# Patient Record
Sex: FEMALE | HISPANIC OR LATINO | Employment: UNEMPLOYED | URBAN - METROPOLITAN AREA
[De-identification: names, ages, dates, MRNs, and addresses within clinical notes are randomized per-mention and may not be internally consistent; named-entity substitution may affect disease eponyms.]

---

## 2023-03-22 ENCOUNTER — TELEMEDICINE (OUTPATIENT)
Dept: ENDOCRINOLOGY | Facility: MEDICAL CENTER | Age: 43
End: 2023-03-22
Payer: COMMERCIAL

## 2023-03-22 DIAGNOSIS — E03.9 HYPOTHYROIDISM, ACQUIRED: ICD-10-CM

## 2023-03-22 DIAGNOSIS — E06.3 HASHIMOTO'S DISEASE: ICD-10-CM

## 2023-03-22 DIAGNOSIS — E55.9 VITAMIN D DEFICIENCY: ICD-10-CM

## 2023-03-22 PROCEDURE — 99204 OFFICE O/P NEW MOD 45 MIN: CPT | Mod: 95

## 2023-03-22 NOTE — PROGRESS NOTES
Chief Complaint: Consult requested by Kylie Trujillo P.A.-C. for evaluation of the following conditions  Patient was presented for a telehealth consultation via secure and encrypted videoconferencing technology. This encounter was conducted via Zoom . Verbal consent was obtained. Patient's identity was verified.   HPI:   Yolanda Wall is a 42 y.o. female     Subclinical Hypothyroidism:   She reports: feeling slow  She denies fatigue, weight gain, feeling cold and cold intolerance, constipation, swelling, losing hair, anxiousness, feeling excessive energy, tremulousness, palpitations, sweating, and weight loss.    She denies lumps or enlargement in the neck.    She denies a family history of thyroid disease     Currently taking multivitamins on and off.     Blood work done from [facility] on 09/2/2022 showed:  Free T3 at 3.2 (2-4 0.4)    2. Hashimoto's Disease:  09/2/2022 in [facility]   TPO  (0-34)  Thyroglobulin AB 10.6 (0.0-0.9)    3. Vitamin D deficiency:   Currently not taking any vitamin D     Patient's medications, allergies, and social histories were reviewed and updated as appropriate.      ROS:     CONS:     No fever, no chills, no weight loss, no fatigue   EYES:      No diplopia, no blurry vision, no redness of eyes, no swelling of eyelids   ENT:    No hearing loss, No ear pain, No sore throat, no dysphagia, no neck swelling   CV:     No chest pain, no palpitations, no claudication, no orthopnea, no PND   PULM:    No SOB, no cough, no hemoptysis, no wheezing    GI:   No nausea, no vomiting, no diarrhea, no constipation, no bloody stools   :  Passing urine well, no dysuria, no hematuria   ENDO:   No polyuria, no polydipsia, no heat intolerance, no cold intolerance   NEURO: No headaches, no dizziness, no convulsions, no tremors   MUSC:  No joint swellings, no arthralgias, no myalgias, no weakness   SKIN:   No rash, no ulcers, no dry skin   PSYCH:   No depression, no anxiety, no  difficulty sleeping       Past Medical History:  Patient Active Problem List    Diagnosis Date Noted    Postpartum care and examination of lactating mother 04/21/2014       Past Surgical History:  No past surgical history on file.     Allergies:  Nkda [no known drug allergy]     Current Medications:    Current Outpatient Medications:     ibuprofen (MOTRIN) 800 MG TABS, Take 1 Tab by mouth every 8 hours as needed (Cramping). (Patient not taking: Reported on 10/6/2020), Disp: 30 Tab, Rfl: 0    prenatal vit/fe fumarate/fa (PRENATAL S) 27-0.8 MG TABS, Take 1 Tab by mouth every morning., Disp: , Rfl:     Social History:  Social History     Socioeconomic History    Marital status:      Spouse name: Not on file    Number of children: Not on file    Years of education: Not on file    Highest education level: Not on file   Occupational History    Not on file   Tobacco Use    Smoking status: Never    Smokeless tobacco: Never   Substance and Sexual Activity    Alcohol use: No    Drug use: No    Sexual activity: Yes     Partners: Male     Comment: none, planned pregnancy FOB involved   Other Topics Concern    Not on file   Social History Narrative    Not on file     Social Determinants of Health     Financial Resource Strain: Not on file   Food Insecurity: Not on file   Transportation Needs: Not on file   Physical Activity: Not on file   Stress: Not on file   Social Connections: Not on file   Intimate Partner Violence: Not on file   Housing Stability: Not on file        Family History:   No family history on file.      PHYSICAL EXAM:   Vital signs: Virtual visit  GENERAL: Well-developed, well-nourished  in no apparent distress.     Labs:  Lab Results   Component Value Date/Time    WBC 10.7 03/08/2014 05:09 AM    RBC 4.65 03/08/2014 05:09 AM    HEMOGLOBIN 14.0 03/08/2014 05:09 AM    MCV 90.0 03/08/2014 05:09 AM    MCH 30.1 03/08/2014 05:09 AM    MCHC 33.5 03/08/2014 05:09 AM    RDW 17.9 (H) 03/08/2014 05:09 AM    MPV 10.5  (H) 03/08/2014 05:09 AM       Imaging:      ASSESSMENT/PLAN:   1. Hypothyroidism, acquired  Clinically mildly unstable  Biochemically I do not have fully blood work  - TSH; Future  - FREE THYROXINE; Future  - T3 FREE; Future  - Comp Metabolic Panel; Future  - TSI; Future  - THYROTROPIN RECEP AB; Future  - THYROID PEROXIDASE  (TPO) AB; Future  - ANTITHYROGLOBULIN AB; Future    Ultrasound ordered for evaluation  - US-THYROID; Future    2. Hashimoto's disease  Positive for Hashimoto's antibodies    3. Vitamin D deficiency  I will evaluate levels  - VITAMIN D,25 HYDROXY (DEFICIENCY); Future     Disposition: Make an appointment to follow-up in 3 months  Do your blood work 2 weeks prior to next appointment-bring your blood work results to the office  Schedule thyroid ultrasound as soon as possible    Thank you kindly for allowing me to participate in the thyroid care plan for this patient.    JOSSIE HackettRCinthyaN.  03/22/23    CC:   Kylie Trujillo P.A.-C.

## 2023-06-14 ENCOUNTER — TELEPHONE (OUTPATIENT)
Dept: ENDOCRINOLOGY | Facility: MEDICAL CENTER | Age: 43
End: 2023-06-14
Payer: COMMERCIAL

## 2023-06-23 ENCOUNTER — OFFICE VISIT (OUTPATIENT)
Dept: ENDOCRINOLOGY | Facility: MEDICAL CENTER | Age: 43
End: 2023-06-23
Payer: COMMERCIAL

## 2023-06-23 VITALS
HEART RATE: 94 BPM | BODY MASS INDEX: 26.83 KG/M2 | SYSTOLIC BLOOD PRESSURE: 108 MMHG | OXYGEN SATURATION: 99 % | DIASTOLIC BLOOD PRESSURE: 66 MMHG | HEIGHT: 62 IN | WEIGHT: 145.8 LBS

## 2023-06-23 DIAGNOSIS — E55.9 VITAMIN D DEFICIENCY: ICD-10-CM

## 2023-06-23 DIAGNOSIS — E03.9 HYPOTHYROIDISM, ACQUIRED: ICD-10-CM

## 2023-06-23 DIAGNOSIS — E06.3 HASHIMOTO'S DISEASE: ICD-10-CM

## 2023-06-23 PROCEDURE — 3074F SYST BP LT 130 MM HG: CPT

## 2023-06-23 PROCEDURE — 99214 OFFICE O/P EST MOD 30 MIN: CPT

## 2023-06-23 PROCEDURE — 99211 OFF/OP EST MAY X REQ PHY/QHP: CPT

## 2023-06-23 PROCEDURE — 3078F DIAST BP <80 MM HG: CPT

## 2023-06-23 RX ORDER — FERROUS SULFATE 325(65) MG
325 TABLET ORAL
COMMUNITY
Start: 2023-04-11

## 2023-06-23 NOTE — PROGRESS NOTES
Chief Complaint: Follow-up: Following conditions  HPI:   Yolanda Wall is a 42 y.o. female           Hashimoto's Disease:     She reports: feeling slow  She denies fatigue, weight gain, feeling cold and cold intolerance, constipation, swelling, losing hair, anxiousness, feeling excessive energy, tremulousness, palpitations, sweating, and weight loss.    She denies lumps or enlargement in the neck.      Currently taking multivitamins on and off.           09/2/2022 in [facility]   TPO  (0-34)  Thyroglobulin AB 10.6 (0.0-0.9)                3. Vitamin D deficiency:   Currently not taking any vitamin D     Patient's medications, allergies, and social histories were reviewed and updated as appropriate.      ROS:     CONS:     No fever, no chills, no weight loss, no fatigue   EYES:      No diplopia, no blurry vision, no redness of eyes, no swelling of eyelids   ENT:    No hearing loss, No ear pain, No sore throat, no dysphagia, no neck swelling   CV:     No chest pain, no palpitations, no claudication, no orthopnea, no PND   PULM:    No SOB, no cough, no hemoptysis, no wheezing    GI:   No nausea, no vomiting, no diarrhea, no constipation, no bloody stools   :  Passing urine well, no dysuria, no hematuria   ENDO:   No polyuria, no polydipsia, no heat intolerance, no cold intolerance   NEURO: No headaches, no dizziness, no convulsions, no tremors   MUSC:  No joint swellings, no arthralgias, no myalgias, no weakness   SKIN:   No rash, no ulcers, no dry skin   PSYCH:   No depression, no anxiety, no difficulty sleeping       Past Medical History:  Patient Active Problem List    Diagnosis Date Noted    Postpartum care and examination of lactating mother 04/21/2014       Past Surgical History:  No past surgical history on file.     Allergies:  Nkda [no known drug allergy]     Current Medications:    Current Outpatient Medications:     ibuprofen (MOTRIN) 800 MG TABS, Take 1 Tab by mouth every 8 hours as  needed (Cramping). (Patient not taking: Reported on 10/6/2020), Disp: 30 Tab, Rfl: 0    prenatal vit/fe fumarate/fa (PRENATAL S) 27-0.8 MG TABS, Take 1 Tab by mouth every morning., Disp: , Rfl:     Social History:  Social History     Socioeconomic History    Marital status:      Spouse name: Not on file    Number of children: Not on file    Years of education: Not on file    Highest education level: Not on file   Occupational History    Not on file   Tobacco Use    Smoking status: Never    Smokeless tobacco: Never   Substance and Sexual Activity    Alcohol use: No    Drug use: No    Sexual activity: Yes     Partners: Male     Comment: none, planned pregnancy FOB involved   Other Topics Concern    Not on file   Social History Narrative    Not on file     Social Determinants of Health     Financial Resource Strain: Not on file   Food Insecurity: Not on file   Transportation Needs: Not on file   Physical Activity: Not on file   Stress: Not on file   Social Connections: Not on file   Intimate Partner Violence: Not on file   Housing Stability: Not on file        Family History:   No family history on file.      PHYSICAL EXAM:   Vital signs:   Vitals:    06/23/23 1020   BP: 108/66   Pulse: 94   SpO2: 99%      GENERAL: Well-developed, well-nourished  in no apparent distress.     Labs:  Lab Results   Component Value Date/Time    WBC 10.7 03/08/2014 05:09 AM    RBC 4.65 03/08/2014 05:09 AM    HEMOGLOBIN 14.0 03/08/2014 05:09 AM    MCV 90.0 03/08/2014 05:09 AM    MCH 30.1 03/08/2014 05:09 AM    MCHC 33.5 03/08/2014 05:09 AM    RDW 17.9 (H) 03/08/2014 05:09 AM    MPV 10.5 (H) 03/08/2014 05:09 AM       Imaging:      ASSESSMENT/PLAN:   1. Hashimoto's disease  Clinically patient reports feeling slow occasionally but this is not bothersome to patient  Biochemically her TSH is high normal but in general her thyroid hormone are within range  We discussed pathophysiology of Hashimoto's disease, risk, when to treat for  hypothyroidism  Patient and I discussed whether or not to try the low-dose Synthroid-at this time patient would like to monitor and repeat this test in 6 months  Ultrasound ordered for baseline evaluation  - TSH; Future  - FREE THYROXINE; Future  - Comp Metabolic Panel; Future  - US-THYROID; Future    2. Vitamin D deficiency  Unstable  Encouraged to take 3000 IUs OTC daily vitamin D3  - VITAMIN D,25 HYDROXY (DEFICIENCY); Future      Disposition: Make an appointment to follow-up in 6 months  Do your blood work 2 weeks prior to next appointment-bring your blood work results to the office  Schedule thyroid ultrasound as soon as possible    Thank you kindly for allowing me to participate in the thyroid care plan for this patient.    JOSSIE HackettRCinthyaN.  06/23/23      CC:   Kylie Trujillo P.A.-C.

## 2023-12-08 ENCOUNTER — APPOINTMENT (OUTPATIENT)
Dept: ENDOCRINOLOGY | Facility: MEDICAL CENTER | Age: 43
End: 2023-12-08
Payer: COMMERCIAL

## 2024-01-18 ENCOUNTER — HOSPITAL ENCOUNTER (OUTPATIENT)
Dept: RADIOLOGY | Facility: MEDICAL CENTER | Age: 44
End: 2024-01-18
Payer: COMMERCIAL

## 2024-01-18 DIAGNOSIS — E06.3 HASHIMOTO'S DISEASE: ICD-10-CM

## 2024-01-18 PROCEDURE — 76536 US EXAM OF HEAD AND NECK: CPT

## 2024-01-23 ENCOUNTER — OFFICE VISIT (OUTPATIENT)
Dept: ENDOCRINOLOGY | Facility: MEDICAL CENTER | Age: 44
End: 2024-01-23
Payer: COMMERCIAL

## 2024-01-23 VITALS
DIASTOLIC BLOOD PRESSURE: 78 MMHG | WEIGHT: 150.4 LBS | BODY MASS INDEX: 26.65 KG/M2 | SYSTOLIC BLOOD PRESSURE: 120 MMHG | HEART RATE: 98 BPM | HEIGHT: 63 IN | OXYGEN SATURATION: 98 %

## 2024-01-23 DIAGNOSIS — E04.1 THYROID NODULE: ICD-10-CM

## 2024-01-23 DIAGNOSIS — E55.9 VITAMIN D DEFICIENCY: ICD-10-CM

## 2024-01-23 DIAGNOSIS — E06.3 HASHIMOTO'S DISEASE: ICD-10-CM

## 2024-01-23 PROCEDURE — 3074F SYST BP LT 130 MM HG: CPT

## 2024-01-23 PROCEDURE — 99211 OFF/OP EST MAY X REQ PHY/QHP: CPT

## 2024-01-23 PROCEDURE — 99214 OFFICE O/P EST MOD 30 MIN: CPT

## 2024-01-23 PROCEDURE — 3078F DIAST BP <80 MM HG: CPT

## 2024-01-23 NOTE — PROGRESS NOTES
Chief Complaint: Follow-up: Following conditions  HPI:   Yolanda Wall is a 43 y.o. female           Hashimoto's Disease:   She denies fatigue, weight gain, feeling cold and cold intolerance, constipation, swelling, losing hair, anxiousness, feeling excessive energy, tremulousness, palpitations, sweating, and weight loss.    She denies lumps or enlargement in the neck.      Currently taking multivitamins on and off.                 09/2/2022 in [facility]   TPO  (0-34)  Thyroglobulin AB 10.6 (0.0-0.9)            2. Thyroid Nodule:    Thyroid US done on 1/18/2023 showed:  Nodule #1 located on the L middle lobe, measuring 2.59 cm, solid Isoechoic, with microscopic foci TR4      3.Vitamin D deficiency:   Currently taking 3000IUs daily             Patient's medications, allergies, and social histories were reviewed and updated as appropriate.      ROS:     CONS:     No fever, no chills, no weight loss, no fatigue   EYES:      No diplopia, no blurry vision, no redness of eyes, no swelling of eyelids   ENT:    No hearing loss, No ear pain, No sore throat, no dysphagia, no neck swelling   CV:     No chest pain, no palpitations, no claudication, no orthopnea, no PND   PULM:    No SOB, no cough, no hemoptysis, no wheezing    GI:   No nausea, no vomiting, no diarrhea, no constipation, no bloody stools   :  Passing urine well, no dysuria, no hematuria   ENDO:   No polyuria, no polydipsia, no heat intolerance, no cold intolerance   NEURO: No headaches, no dizziness, no convulsions, no tremors   MUSC:  No joint swellings, no arthralgias, no myalgias, no weakness   SKIN:   No rash, no ulcers, no dry skin   PSYCH:   No depression, no anxiety, no difficulty sleeping       Past Medical History:  Patient Active Problem List    Diagnosis Date Noted    Postpartum care and examination of lactating mother 04/21/2014       Past Surgical History:  No past surgical history on file.     Allergies:  Nkda [no known drug  allergy]     Current Medications:    Current Outpatient Medications:     FEROSUL 325 (65 Fe) MG tablet, Take 325 mg by mouth every day., Disp: , Rfl:     ibuprofen (MOTRIN) 800 MG TABS, Take 1 Tab by mouth every 8 hours as needed (Cramping). (Patient not taking: Reported on 6/23/2023), Disp: 30 Tab, Rfl: 0    prenatal vit/fe fumarate/fa (PRENATAL S) 27-0.8 MG TABS, Take 1 Tab by mouth every morning. (Patient not taking: Reported on 6/23/2023), Disp: , Rfl:     Social History:  Social History     Socioeconomic History    Marital status:      Spouse name: Not on file    Number of children: Not on file    Years of education: Not on file    Highest education level: Not on file   Occupational History    Not on file   Tobacco Use    Smoking status: Never    Smokeless tobacco: Never   Substance and Sexual Activity    Alcohol use: No    Drug use: No    Sexual activity: Yes     Partners: Male     Comment: none, planned pregnancy FOB involved   Other Topics Concern    Not on file   Social History Narrative    Not on file     Social Determinants of Health     Financial Resource Strain: Not on file   Food Insecurity: Not on file   Transportation Needs: Not on file   Physical Activity: Not on file   Stress: Not on file   Social Connections: Not on file   Intimate Partner Violence: Not on file   Housing Stability: Not on file        Family History:   No family history on file.      PHYSICAL EXAM:   Vital signs:   Vitals:    01/23/24 0832   BP: 120/78   Pulse: 98   SpO2: 98%        GENERAL: Well-developed, well-nourished  in no apparent distress.     Labs:  Lab Results   Component Value Date/Time    WBC 10.7 03/08/2014 05:09 AM    RBC 4.65 03/08/2014 05:09 AM    HEMOGLOBIN 14.0 03/08/2014 05:09 AM    MCV 90.0 03/08/2014 05:09 AM    MCH 30.1 03/08/2014 05:09 AM    MCHC 33.5 03/08/2014 05:09 AM    RDW 17.9 (H) 03/08/2014 05:09 AM    MPV 10.5 (H) 03/08/2014 05:09 AM       Imaging:      ASSESSMENT/PLAN:   1. Hashimoto's  disease  - Stable  --Biochemaclly stable  --No thyroid medication at this time    - TSH; Future  - FREE THYROXINE; Future  - Comp Metabolic Panel; Future    2. Thyroid nodule  Unstable  Based on results it is my recommendation to perform a thyroid Biopsy  All questions answered  Discussed the possible results - benign, cancer, poor sampling, undetermined   - US-FNA BIOPSY W/ US GUIDANCE; Future    3. Vitamin D deficiency  - Stable  -Continue regimen-see HPI         Disposition: Make an appointment to follow-up in 6 months  Do your blood work 2 weeks prior to next appointment-bring your blood work results to the office  Schedule thyroid ultrasound as soon as possible    Thank you kindly for allowing me to participate in the thyroid care plan for this patient.    Enmanuel Sawyer, MILANA.P.R.N.  01/23/24          CC:   Kylie Trujillo P.A.-C.

## 2024-01-30 ENCOUNTER — HOSPITAL ENCOUNTER (OUTPATIENT)
Dept: RADIOLOGY | Facility: MEDICAL CENTER | Age: 44
End: 2024-01-30
Payer: COMMERCIAL

## 2024-01-30 DIAGNOSIS — E04.1 THYROID NODULE: ICD-10-CM

## 2024-01-30 LAB — CYTOLOGY REG CYTOL: NORMAL

## 2024-01-30 PROCEDURE — 10005 FNA BX W/US GDN 1ST LES: CPT

## 2024-01-30 PROCEDURE — 88173 CYTOPATH EVAL FNA REPORT: CPT

## 2024-01-30 NOTE — PROGRESS NOTES
US guided left middle lobe thyroid nodule fine needle aspiration done by Dr. Wilson; NON-SEDATION (no H&P required as this is a NON SEDATION procedure) left aspect of neck access site, dressing CDI; 3 passes in 1 jar of cytolyt obtained, 2 passes in 1 vial afirma obtained and sent to lab. Pt tolerated the procedure well. Pt hemodynamically stable pre/intra/post procedure; all questions and concerns answered prior to being d/c; patient provided with appropriate education for procedure; pt d/c home.

## 2024-03-19 ENCOUNTER — OFFICE VISIT (OUTPATIENT)
Dept: ENDOCRINOLOGY | Facility: MEDICAL CENTER | Age: 44
End: 2024-03-19
Payer: COMMERCIAL

## 2024-03-19 VITALS
BODY MASS INDEX: 27.7 KG/M2 | SYSTOLIC BLOOD PRESSURE: 112 MMHG | DIASTOLIC BLOOD PRESSURE: 64 MMHG | OXYGEN SATURATION: 98 % | HEART RATE: 84 BPM | HEIGHT: 62 IN | WEIGHT: 150.5 LBS

## 2024-03-19 DIAGNOSIS — E55.9 VITAMIN D DEFICIENCY: ICD-10-CM

## 2024-03-19 DIAGNOSIS — E07.9 THYROID DISORDER: ICD-10-CM

## 2024-03-19 DIAGNOSIS — E04.1 THYROID NODULE: ICD-10-CM

## 2024-03-19 DIAGNOSIS — E06.3 HASHIMOTO'S DISEASE: ICD-10-CM

## 2024-03-19 PROCEDURE — 3074F SYST BP LT 130 MM HG: CPT

## 2024-03-19 PROCEDURE — 99214 OFFICE O/P EST MOD 30 MIN: CPT

## 2024-03-19 PROCEDURE — 99211 OFF/OP EST MAY X REQ PHY/QHP: CPT

## 2024-03-19 PROCEDURE — 3078F DIAST BP <80 MM HG: CPT

## 2024-03-19 RX ORDER — MULTIVIT-MIN/IRON/FOLIC ACID/K 18-600-40
3000 CAPSULE ORAL DAILY
COMMUNITY

## 2024-03-19 NOTE — PROGRESS NOTES
Chief Complaint: Follow-up: Following conditions  HPI:   Yolanda Wall is a 43 y.o. female           Hashimoto's Disease:   She denies fatigue, weight gain, feeling cold and cold intolerance, constipation, swelling, anxiousness, feeling excessive energy, tremulousness, palpitations, sweating, and weight loss.   Reports  forgetful, hair loss   She denies lumps or enlargement in the neck.      Currently taking multivitamins on and off.                   09/2/2022 in [facility]   TPO  (0-34)  Thyroglobulin AB 10.6 (0.0-0.9)    2. Thyroid Nodule:    Thyroid US done on 1/18/2023 showed:  Nodule #1 located on the L middle lobe, measuring 2.59 cm, solid Isoechoic, with microscopic foci TR4        3.Vitamin D deficiency:   Currently taking 3000IUs daily         4. Thyroid Disorder:  Pt is also taking iron for iron deficiency anemia-treated by pcp    Reports  forgetful, hair loss             Patient's medications, allergies, and social histories were reviewed and updated as appropriate.  ROS:     CONS:     No fever, no chills, no weight loss, no fatigue   EYES:      No diplopia, no blurry vision, no redness of eyes, no swelling of eyelids   ENT:    No hearing loss, No ear pain, No sore throat, no dysphagia, no neck swelling   CV:     No chest pain, no palpitations, no claudication, no orthopnea, no PND   PULM:    No SOB, no cough, no hemoptysis, no wheezing    GI:   No nausea, no vomiting, no diarrhea, no constipation, no bloody stools   :  Passing urine well, no dysuria, no hematuria   ENDO:   No polyuria, no polydipsia, no heat intolerance, no cold intolerance   NEURO: No headaches, no dizziness, no convulsions, no tremors   MUSC:  No joint swellings, no arthralgias, no myalgias, no weakness   SKIN:   No rash, no ulcers, no dry skin   PSYCH:   No depression, no anxiety, no difficulty sleeping       Past Medical History:  Patient Active Problem List    Diagnosis Date Noted    Postpartum care and  examination of lactating mother 04/21/2014       Past Surgical History:  No past surgical history on file.     Allergies:  Nkda [no known drug allergy]     Current Medications:    Current Outpatient Medications:     FEROSUL 325 (65 Fe) MG tablet, Take 325 mg by mouth every day., Disp: , Rfl:     Social History:  Social History     Socioeconomic History    Marital status:      Spouse name: Not on file    Number of children: Not on file    Years of education: Not on file    Highest education level: Not on file   Occupational History    Not on file   Tobacco Use    Smoking status: Never    Smokeless tobacco: Never   Substance and Sexual Activity    Alcohol use: No    Drug use: No    Sexual activity: Yes     Partners: Male     Comment: none, planned pregnancy FOB involved   Other Topics Concern    Not on file   Social History Narrative    Not on file     Social Determinants of Health     Financial Resource Strain: Not on file   Food Insecurity: Not on file   Transportation Needs: Not on file   Physical Activity: Not on file   Stress: Not on file   Social Connections: Not on file   Intimate Partner Violence: Not on file   Housing Stability: Not on file        Family History:   No family history on file.      PHYSICAL EXAM:   Vital signs:   Vitals:    03/19/24 0841   BP: 112/64   Pulse: 84   SpO2: 98%          GENERAL: Well-developed, well-nourished  in no apparent distress.     Labs:  Lab Results   Component Value Date/Time    WBC 10.7 03/08/2014 05:09 AM    RBC 4.65 03/08/2014 05:09 AM    HEMOGLOBIN 14.0 03/08/2014 05:09 AM    MCV 90.0 03/08/2014 05:09 AM    MCH 30.1 03/08/2014 05:09 AM    MCHC 33.5 03/08/2014 05:09 AM    RDW 17.9 (H) 03/08/2014 05:09 AM    MPV 10.5 (H) 03/08/2014 05:09 AM       Imaging:      ASSESSMENT/PLAN:   1. Hashimoto's disease  - Stable  --Biochemaclly stable  --No thyroid medication at this time      2. Thyroid nodule  - Stable  Benign thyroid biopsy   Next thyroid US to be done in 6  months to 12 months       3. Vitamin D deficiency  - Stable  -Continue regimen-see HPI     4. Thyroid disorder  Clinically mildly unstable but pt feels she is feeling better now that she is being treated for iron deficiency anemia by her pcp  We will monitor her levels at time for high normal TSH   - TSH; Future  - FREE THYROXINE; Future  - Comp Metabolic Panel; Future     Disposition: Make an appointment to follow-up in 6 months  Do blood work 2 weeks before next appt  US thyroid to be ordered at next appt     Thank you kindly for allowing me to participate in the thyroid care plan for this patient.    Enmanuel Sawyer A.P.R.N.  01/23/24          CC:   Kylie Trujillo P.A.-C.

## 2024-09-15 NOTE — PROGRESS NOTES
"Follow up Endocrinology Visit  Last visit with Enmanuel Sawyer on: 3/19/24  Referred by: Kylie Trujillo P.A.-C. (Inactive)    Chief complaint:  Yolanda Wall, 44 y.o., Gren[place] speaking female, who is here for follow up for thyroid nodule. Communication with help of .     Interval history:   - since last visit no significant events  - denies neck compressive symptoms  - reports stable weight, has some day time sleepiness - intermittently, denies dry skin, constipation, cold intolerance    Thyroid nodule:  - first found in 1/2024 on palpation -> thyroid US -> 2.59 cm L thyroid nodule, TR 4 -> FNA -> benign  - compressive symptoms denies  - subclinical hypothyroidism  - Hx of radiation exposure - denies  - Fhx of thyroid cancer - denies    Medications:  Current Outpatient Medications:     Vitamin D, Cholecalciferol, (CHOLECALCIFEROL) 25 MCG (1000 UT) Tab, Take 3,000 Units by mouth every day., Disp: , Rfl:     FEROSUL 325 (65 Fe) MG tablet, Take 325 mg by mouth every day., Disp: , Rfl:     Physical Examination:   Vital signs: /82   Pulse 90   Ht 1.549 m (5' 1\") Comment: pt stated  Wt 67.7 kg (149 lb 4.8 oz)   SpO2 99%   BMI 28.21 kg/m²    General: No distress, cooperative, well dressed and well nourished.   Eyes: No scleral icterus or discharge  ENMT: Normal on external inspection of nose, lips, No nasal drainage   Neck: No abnormal masses on inspection. Palpable L thyroid nodule.  Resp: Normal effort  CVS: Regular rate and rhythm  Extremities: No edema bilateral extremities  Neuro: Alert and oriented  Skin: No rash, No Ulcers  Psych: Normal mood and affect    Labs:  - reviewed   Reference Range  9/2/22 1/22/24 9/12/24   TSH 0.36 - 3.74 4.16 3.56 4.15 (H)   fT4 0.8 - 1.8 1.1  1.11   TPO-Abs 0 - 34 115 (H)     Tg-Abs 0.0 - 0.9 10.6 (H)     Vit D 30 - 100        Imaging:  - reviewed  Thyroid US on 1/18/2024:  HISTORY/REASON FOR EXAM:  Hashimoto's disease  TECHNIQUE/EXAM DESCRIPTION: " Ultrasound of the soft tissues of the head and neck.  COMPARISON:  None  FINDINGS:  The thyroid gland is heterogeneous.  Vascularity is normal.  The right lobe of the thyroid gland measures 1.72 cm x 5.33 cm x 1.94 cm.  The left lobe of the thyroid gland measures 1.81 cm x 5.00 cm x 2.35 cm.  The isthmus measures 0.22 cm.  Nodules >= 1cm:  Nodule #1  Location:  Left  mid  Size:  2.59 x 2.01 x 1.46 cm  Composition:  Solid-2  Echogenicity:  Isoechoic-1  Shape:  Wider than tall-0  Margins:  Smooth-0  Echogenic Foci:  Microscopic-3  ACR TIRADS points/category:  6 - TR4 - Moderately Suspicious  IMPRESSION:  1.  Moderately suspicious 2.6 cm left mid thyroid gland nodule.    Cytology:  - reviewed  Thyroid nodule FNA on 1/30/24:  Fine needle aspiration, left middle thyroid: [place] Category II: Benign   Benign follicular nodule with cystic changes     Assessment and Plan:  # L thyroid nodule, s/p FNA in 1/2024 - [place] 2  - noted on physical exam in 1/2024  - thyroid US -> 2.59 cm L thyroid nodule, TR 4 -> FNA -> benign  -  no hyperthyroidism  - no compression symptoms  - no risk factors for thyroid cancer  - discussed natural history of thyroid nodules, concerns associated with thyroid nodules, indications for treatment  - overall reassured the patient  - due for repeat thyroid US in 1/2025  Plan:  Reassured the patient.   Repeat thyroid US in 1/2025    # Subclinical hypothyroidism secondary likely to Hashimoto's thyroiditis  - mild elevation of TSH x 1 + elevated levels of TPO/Tg- Abs  - asymptomatic  - discussed indications for hypothyroidism treatment: clinical hypothyroidism, TSH > 7.5 in pts < 64 yo, symptomatic disease  Plan:  Reassured the patient. No treatment is required at the moment.   Repeat TFTs in 4 mo.     # Vit D Deficiency  - on vit D supplementation  - repeat vit D level prior the next visit    RTC:  4 mo    Total time (face-to-face and non-face-to face time): 50 min -  extensive discussion with help  of  of diagnoses, treatment, prognosis, medical charts, lab, imaging, pathology review, documentation.  Plan reviewed with the patient and agreed with plan.  All questions answered to patient's satisfaction.  Thank you kindly for allowing me to participate in the care plan for this patient.  Leonie Guajardo MD    CC:   Kylie Trujillo P.A.-C. (Inactive)

## 2024-09-19 ENCOUNTER — OFFICE VISIT (OUTPATIENT)
Dept: ENDOCRINOLOGY | Facility: MEDICAL CENTER | Age: 44
End: 2024-09-19
Attending: STUDENT IN AN ORGANIZED HEALTH CARE EDUCATION/TRAINING PROGRAM
Payer: COMMERCIAL

## 2024-09-19 VITALS
HEART RATE: 90 BPM | BODY MASS INDEX: 28.19 KG/M2 | HEIGHT: 61 IN | OXYGEN SATURATION: 99 % | DIASTOLIC BLOOD PRESSURE: 82 MMHG | WEIGHT: 149.3 LBS | SYSTOLIC BLOOD PRESSURE: 134 MMHG

## 2024-09-19 DIAGNOSIS — E55.9 VITAMIN D DEFICIENCY: ICD-10-CM

## 2024-09-19 DIAGNOSIS — E03.8 SUBCLINICAL HYPOTHYROIDISM: ICD-10-CM

## 2024-09-19 DIAGNOSIS — E04.9 NODULAR GOITER: ICD-10-CM

## 2024-09-19 PROCEDURE — 99211 OFF/OP EST MAY X REQ PHY/QHP: CPT | Performed by: STUDENT IN AN ORGANIZED HEALTH CARE EDUCATION/TRAINING PROGRAM

## 2025-01-24 ENCOUNTER — TELEPHONE (OUTPATIENT)
Dept: ENDOCRINOLOGY | Facility: MEDICAL CENTER | Age: 45
End: 2025-01-24
Payer: COMMERCIAL

## 2025-01-24 NOTE — TELEPHONE ENCOUNTER
With the help of  ([phone]) I was able to call the pt and leave a voicemail reminding them of their appt on 02/07/25 and that they have labs due prior to their appt. I left a call back number in case they had any questions or concerns.

## 2025-01-28 ENCOUNTER — TELEPHONE (OUTPATIENT)
Dept: ENDOCRINOLOGY | Facility: MEDICAL CENTER | Age: 45
End: 2025-01-28
Payer: COMMERCIAL

## 2025-01-28 NOTE — TELEPHONE ENCOUNTER
Pt called and with the help of  [id number] I was able to help confirm for them that the location she is going to get her Ultrasound on 2/3/2025 has received the order for it and she should be all good to go.

## 2025-02-02 NOTE — PROGRESS NOTES
"Follow up Endocrinology Visit  Last visit with Enmanuel Sawyer on: 3/19/24  Last visit with me on: 9/19/24  Referred by: Kylie Trujillo P.A.-C. (Inactive)    Chief complaint:  Yolanda Wall, 44 y.o., [place] speaking female, who is here for follow up for thyroid nodule. Communication with help of .     Interval history:   - overall doing well  - no concerns, no questions  - denies neck discomfort, dysphagia  - reviewed recent labs and thyroid US  Prior notes:  9/19/24  - since last visit no significant events  - denies neck compressive symptoms  - reports stable weight, has some day time sleepiness - intermittently, denies dry skin, constipation, cold intolerance    Thyroid nodule:  - first found in 1/2024 on palpation -> thyroid US -> 2.59 cm L thyroid nodule, TR 4 -> FNA -> benign  - compressive symptoms denies  - subclinical hypothyroidism  - Hx of radiation exposure - denies  - Fhx of thyroid cancer - denies    Medications:  Current Outpatient Medications:     Vitamin D, Cholecalciferol, (CHOLECALCIFEROL) 25 MCG (1000 UT) Tab, Take 3,000 Units by mouth every day., Disp: , Rfl:     FEROSUL 325 (65 Fe) MG tablet, Take 325 mg by mouth every day., Disp: , Rfl:     Physical Examination:   Vital signs: /74   Pulse 91   Ht 1.575 m (5' 2\") Comment: pt stated  Wt 67 kg (147 lb 12.8 oz)   SpO2 98%   BMI 27.03 kg/m²   General: No distress, cooperative, well dressed and well nourished.   Eyes: No scleral icterus or discharge  ENMT: Normal on external inspection of nose, lips, No nasal drainage   Neck: No abnormal masses on inspection. Palpable L thyroid nodule.  Resp: Normal effort  CVS: Regular rate and rhythm  Extremities: No edema bilateral extremities  Neuro: Alert and oriented  Skin: No rash, No Ulcers  Psych: Normal mood and affect    Labs:  - reviewed   Reference Range  9/2/22 1/22/24 9/12/24 1/25/25   TSH 0.36 - 3.74 4.16 3.56 4.15 (H) 3.67   fT4 0.8 - 1.8 1.1  1.11 1.08 "   TPO-Abs 0 - 34 115 (H)      Tg-Abs 0.0 - 0.9 10.6 (H)      Vit D 30 - 100    31     Imaging:  - reviewed  Thyroid US on 2/3/25:   HISTORY/REASON FOR EXAM:  Follow-up left lobe thyroid nodule which was biopsied as benign in January 2024.  COMPARISON:  1/18/2024 ultrasound of the soft tissues of the head and neck.  FINDINGS:  The thyroid gland is heterogeneous.  Vascularity is normal.  The right lobe of the thyroid gland measures 1.73 cm x 4.76 cm x 1.91 cm.  The left lobe of the thyroid gland measures 2.09 cm x 4.71 cm x 2.19 cm.  The isthmus measures 0.21 cm.  Nodules >= 1cm:  Nodule #1  Location:  Left  mid  Size:  2.46 x 1.79 x 1.70 cm, volume - 3.74 ml; - 4% (previously 2.6 x 2.0 x 1.5 cm - 3.9 ml)  Composition:  Mixed-1  Echogenicity:  Isoechoic-1  Shape:  Wider than tall-0  Margins:  Smooth-0  Echogenic Foci:  Microscopic-3  ACR TIRADS points/category:  5 - TR4 - Moderately Suspicious  IMPRESSION:  1.  Overall, no significant change in size of the recently biopsied benign left mid thyroid gland nodule.     Thyroid US on 1/18/2024:  HISTORY/REASON FOR EXAM:  Hashimoto's disease  TECHNIQUE/EXAM DESCRIPTION: Ultrasound of the soft tissues of the head and neck.  COMPARISON:  None  FINDINGS:  The thyroid gland is heterogeneous.  Vascularity is normal.  The right lobe of the thyroid gland measures 1.72 cm x 5.33 cm x 1.94 cm.  The left lobe of the thyroid gland measures 1.81 cm x 5.00 cm x 2.35 cm.  The isthmus measures 0.22 cm.  Nodules >= 1cm:  Nodule #1  Location:  Left  mid  Size:  2.59 x 2.01 x 1.46 cm  Composition:  Solid-2  Echogenicity:  Isoechoic-1  Shape:  Wider than tall-0  Margins:  Smooth-0  Echogenic Foci:  Microscopic-3  ACR TIRADS points/category:  6 - TR4 - Moderately Suspicious  IMPRESSION:  1.  Moderately suspicious 2.6 cm left mid thyroid gland nodule.    Cytology:  - reviewed  Thyroid nodule FNA on 1/30/24:  Fine needle aspiration, left middle thyroid: [place] Category II: Benign   Benign  follicular nodule with cystic changes     Assessment and Plan:  # L thyroid nodule, s/p FNA in 1/2024 - [place] 2  - stable, reassured the patient  Prior notes:  9/19/24  - noted on physical exam in 1/2024  - thyroid US -> 2.59 cm L thyroid nodule, TR 4 -> FNA -> benign  -  no hyperthyroidism  - no compression symptoms  - no risk factors for thyroid cancer  - discussed natural history of thyroid nodules, concerns associated with thyroid nodules, indications for treatment  - overall reassured the patient  - due for repeat thyroid US in 1/2025  Plan:  Reassured the patient.   Repeat thyroid US in 1 year.     # Subclinical hypothyroidism secondary likely to Hashimoto's thyroiditis, resolved  - currently euthyroid without treatment, reassured the patient  - repeat TFTs in 1 year  Prior notes:  9/19/24  - mild elevation of TSH x 1 + elevated levels of TPO/Tg- Abs  - asymptomatic  - discussed indications for hypothyroidism treatment: clinical hypothyroidism, TSH > 7.5 in pts < 64 yo, symptomatic disease  Plan:  Reassured the patient. No treatment is required at the moment.   Repeat TFTs in 4 mo.     # Vit D Deficiency  - vit D level normalized  - continue current vit D supplementation  - repeat vit D level in 1 year  Prior notes:  9/19/24  - on vit D supplementation  - repeat vit D level prior the next visit    RTC:  12 mo    Total time (face-to-face and non-face-to face time): 30 min     Plan reviewed with the patient and agreed with plan.  All questions answered to patient's satisfaction.  Thank you kindly for allowing me to participate in the care plan for this patient.  Leonie Guajardo MD    CC:   Kylie Trujillo P.A.-C. (Inactive)

## 2025-02-03 ENCOUNTER — HOSPITAL ENCOUNTER (OUTPATIENT)
Dept: RADIOLOGY | Facility: MEDICAL CENTER | Age: 45
End: 2025-02-03
Attending: STUDENT IN AN ORGANIZED HEALTH CARE EDUCATION/TRAINING PROGRAM
Payer: COMMERCIAL

## 2025-02-03 DIAGNOSIS — E04.9 NODULAR GOITER: ICD-10-CM

## 2025-02-03 PROCEDURE — 76536 US EXAM OF HEAD AND NECK: CPT

## 2025-02-07 ENCOUNTER — OFFICE VISIT (OUTPATIENT)
Dept: ENDOCRINOLOGY | Facility: MEDICAL CENTER | Age: 45
End: 2025-02-07
Attending: STUDENT IN AN ORGANIZED HEALTH CARE EDUCATION/TRAINING PROGRAM
Payer: COMMERCIAL

## 2025-02-07 VITALS
HEART RATE: 91 BPM | HEIGHT: 62 IN | DIASTOLIC BLOOD PRESSURE: 74 MMHG | OXYGEN SATURATION: 98 % | WEIGHT: 147.8 LBS | BODY MASS INDEX: 27.2 KG/M2 | SYSTOLIC BLOOD PRESSURE: 120 MMHG

## 2025-02-07 DIAGNOSIS — E03.8 SUBCLINICAL HYPOTHYROIDISM: ICD-10-CM

## 2025-02-07 DIAGNOSIS — E04.9 NODULAR GOITER: ICD-10-CM

## 2025-02-07 DIAGNOSIS — E55.9 VITAMIN D DEFICIENCY: ICD-10-CM

## 2025-02-07 PROCEDURE — 99211 OFF/OP EST MAY X REQ PHY/QHP: CPT | Performed by: STUDENT IN AN ORGANIZED HEALTH CARE EDUCATION/TRAINING PROGRAM

## 2025-02-07 PROCEDURE — 3074F SYST BP LT 130 MM HG: CPT | Performed by: STUDENT IN AN ORGANIZED HEALTH CARE EDUCATION/TRAINING PROGRAM

## 2025-02-07 PROCEDURE — 99214 OFFICE O/P EST MOD 30 MIN: CPT | Performed by: STUDENT IN AN ORGANIZED HEALTH CARE EDUCATION/TRAINING PROGRAM

## 2025-02-07 PROCEDURE — 3078F DIAST BP <80 MM HG: CPT | Performed by: STUDENT IN AN ORGANIZED HEALTH CARE EDUCATION/TRAINING PROGRAM
